# Patient Record
Sex: MALE | Race: OTHER | HISPANIC OR LATINO
[De-identification: names, ages, dates, MRNs, and addresses within clinical notes are randomized per-mention and may not be internally consistent; named-entity substitution may affect disease eponyms.]

---

## 2017-02-15 ENCOUNTER — RECORD ABSTRACTING (OUTPATIENT)
Age: 62
End: 2017-02-15

## 2017-02-15 DIAGNOSIS — F15.90 OTHER STIMULANT USE, UNSPECIFIED, UNCOMPLICATED: ICD-10-CM

## 2017-02-15 DIAGNOSIS — Z78.9 OTHER SPECIFIED HEALTH STATUS: ICD-10-CM

## 2017-02-15 DIAGNOSIS — Z83.3 FAMILY HISTORY OF DIABETES MELLITUS: ICD-10-CM

## 2017-02-15 DIAGNOSIS — K57.30 DIVERTICULOSIS OF LARGE INTESTINE W/OUT PERFORATION OR ABSCESS W/OUT BLEEDING: ICD-10-CM

## 2017-03-01 ENCOUNTER — LABORATORY RESULT (OUTPATIENT)
Age: 62
End: 2017-03-01

## 2017-03-23 ENCOUNTER — APPOINTMENT (OUTPATIENT)
Dept: GASTROENTEROLOGY | Facility: CLINIC | Age: 62
End: 2017-03-23

## 2017-03-23 VITALS
RESPIRATION RATE: 12 BRPM | WEIGHT: 228 LBS | DIASTOLIC BLOOD PRESSURE: 90 MMHG | SYSTOLIC BLOOD PRESSURE: 144 MMHG | BODY MASS INDEX: 32.64 KG/M2 | HEIGHT: 70 IN | HEART RATE: 98 BPM

## 2018-05-29 ENCOUNTER — RX RENEWAL (OUTPATIENT)
Age: 63
End: 2018-05-29

## 2018-05-29 RX ORDER — PANTOPRAZOLE 40 MG/1
40 TABLET, DELAYED RELEASE ORAL TWICE DAILY
Qty: 180 | Refills: 2 | Status: ACTIVE | COMMUNITY
Start: 2017-03-23 | End: 1900-01-01

## 2019-01-17 ENCOUNTER — EMERGENCY (EMERGENCY)
Facility: HOSPITAL | Age: 64
LOS: 1 days | Discharge: HOME | End: 2019-01-17
Attending: EMERGENCY MEDICINE
Payer: COMMERCIAL

## 2019-01-17 VITALS
DIASTOLIC BLOOD PRESSURE: 92 MMHG | SYSTOLIC BLOOD PRESSURE: 143 MMHG | OXYGEN SATURATION: 96 % | HEART RATE: 85 BPM | TEMPERATURE: 98 F | RESPIRATION RATE: 18 BRPM

## 2019-01-17 VITALS — WEIGHT: 210.1 LBS

## 2019-01-17 DIAGNOSIS — Z98.89 OTHER SPECIFIED POSTPROCEDURAL STATES: Chronic | ICD-10-CM

## 2019-01-17 LAB
ALBUMIN SERPL ELPH-MCNC: 4.3 G/DL — SIGNIFICANT CHANGE UP (ref 3.3–5.2)
ALP SERPL-CCNC: 71 U/L — SIGNIFICANT CHANGE UP (ref 40–120)
ALT FLD-CCNC: 22 U/L — SIGNIFICANT CHANGE UP
ANION GAP SERPL CALC-SCNC: 12 MMOL/L — SIGNIFICANT CHANGE UP (ref 5–17)
APTT BLD: 28.1 SEC — SIGNIFICANT CHANGE UP (ref 27.5–36.3)
AST SERPL-CCNC: 21 U/L — SIGNIFICANT CHANGE UP
BASOPHILS # BLD AUTO: 0.2 K/UL — SIGNIFICANT CHANGE UP (ref 0–0.2)
BASOPHILS NFR BLD AUTO: 2 % — SIGNIFICANT CHANGE UP (ref 0–2)
BILIRUB SERPL-MCNC: 0.4 MG/DL — SIGNIFICANT CHANGE UP (ref 0.4–2)
BLD GP AB SCN SERPL QL: SIGNIFICANT CHANGE UP
BUN SERPL-MCNC: 12 MG/DL — SIGNIFICANT CHANGE UP (ref 8–20)
CALCIUM SERPL-MCNC: 9.1 MG/DL — SIGNIFICANT CHANGE UP (ref 8.6–10.2)
CHLORIDE SERPL-SCNC: 100 MMOL/L — SIGNIFICANT CHANGE UP (ref 98–107)
CO2 SERPL-SCNC: 27 MMOL/L — SIGNIFICANT CHANGE UP (ref 22–29)
CREAT SERPL-MCNC: 0.83 MG/DL — SIGNIFICANT CHANGE UP (ref 0.5–1.3)
EOSINOPHIL # BLD AUTO: 0.2 K/UL — SIGNIFICANT CHANGE UP (ref 0–0.5)
EOSINOPHIL NFR BLD AUTO: 2.8 % — SIGNIFICANT CHANGE UP (ref 0–6)
GLUCOSE SERPL-MCNC: 103 MG/DL — SIGNIFICANT CHANGE UP (ref 70–115)
HCT VFR BLD CALC: 45 % — SIGNIFICANT CHANGE UP (ref 42–52)
HGB BLD-MCNC: 14.5 G/DL — SIGNIFICANT CHANGE UP (ref 14–18)
INR BLD: 1.08 RATIO — SIGNIFICANT CHANGE UP (ref 0.88–1.16)
LIDOCAIN IGE QN: 23 U/L — SIGNIFICANT CHANGE UP (ref 22–51)
LYMPHOCYTES # BLD AUTO: 2.3 K/UL — SIGNIFICANT CHANGE UP (ref 1–4.8)
LYMPHOCYTES # BLD AUTO: 31 % — SIGNIFICANT CHANGE UP (ref 20–55)
MCHC RBC-ENTMCNC: 26.2 PG — LOW (ref 27–31)
MCHC RBC-ENTMCNC: 32.2 G/DL — SIGNIFICANT CHANGE UP (ref 32–36)
MCV RBC AUTO: 81.2 FL — SIGNIFICANT CHANGE UP (ref 80–94)
MONOCYTES # BLD AUTO: 0.7 K/UL — SIGNIFICANT CHANGE UP (ref 0–0.8)
MONOCYTES NFR BLD AUTO: 9.5 % — SIGNIFICANT CHANGE UP (ref 3–10)
NEUTROPHILS # BLD AUTO: 4.1 K/UL — SIGNIFICANT CHANGE UP (ref 1.8–8)
NEUTROPHILS NFR BLD AUTO: 54.7 % — SIGNIFICANT CHANGE UP (ref 37–73)
PLATELET # BLD AUTO: 346 K/UL — SIGNIFICANT CHANGE UP (ref 150–400)
POTASSIUM SERPL-MCNC: 4.6 MMOL/L — SIGNIFICANT CHANGE UP (ref 3.5–5.3)
POTASSIUM SERPL-SCNC: 4.6 MMOL/L — SIGNIFICANT CHANGE UP (ref 3.5–5.3)
PROT SERPL-MCNC: 7.9 G/DL — SIGNIFICANT CHANGE UP (ref 6.6–8.7)
PROTHROM AB SERPL-ACNC: 12.4 SEC — SIGNIFICANT CHANGE UP (ref 10–12.9)
RBC # BLD: 5.54 M/UL — SIGNIFICANT CHANGE UP (ref 4.6–6.2)
RBC # FLD: 15.2 % — SIGNIFICANT CHANGE UP (ref 11–15.6)
SODIUM SERPL-SCNC: 139 MMOL/L — SIGNIFICANT CHANGE UP (ref 135–145)
TROPONIN T SERPL-MCNC: <0.01 NG/ML — SIGNIFICANT CHANGE UP (ref 0–0.06)
TYPE + AB SCN PNL BLD: SIGNIFICANT CHANGE UP
WBC # BLD: 7.5 K/UL — SIGNIFICANT CHANGE UP (ref 4.8–10.8)
WBC # FLD AUTO: 7.5 K/UL — SIGNIFICANT CHANGE UP (ref 4.8–10.8)

## 2019-01-17 PROCEDURE — 85610 PROTHROMBIN TIME: CPT

## 2019-01-17 PROCEDURE — 86900 BLOOD TYPING SEROLOGIC ABO: CPT

## 2019-01-17 PROCEDURE — 99284 EMERGENCY DEPT VISIT MOD MDM: CPT

## 2019-01-17 PROCEDURE — 84484 ASSAY OF TROPONIN QUANT: CPT

## 2019-01-17 PROCEDURE — 85730 THROMBOPLASTIN TIME PARTIAL: CPT

## 2019-01-17 PROCEDURE — 96375 TX/PRO/DX INJ NEW DRUG ADDON: CPT

## 2019-01-17 PROCEDURE — T1013: CPT

## 2019-01-17 PROCEDURE — 86850 RBC ANTIBODY SCREEN: CPT

## 2019-01-17 PROCEDURE — 86901 BLOOD TYPING SEROLOGIC RH(D): CPT

## 2019-01-17 PROCEDURE — 71250 CT THORAX DX C-: CPT

## 2019-01-17 PROCEDURE — 99284 EMERGENCY DEPT VISIT MOD MDM: CPT | Mod: 25

## 2019-01-17 PROCEDURE — 96374 THER/PROPH/DIAG INJ IV PUSH: CPT

## 2019-01-17 PROCEDURE — 80053 COMPREHEN METABOLIC PANEL: CPT

## 2019-01-17 PROCEDURE — 85027 COMPLETE CBC AUTOMATED: CPT

## 2019-01-17 PROCEDURE — 71250 CT THORAX DX C-: CPT | Mod: 26

## 2019-01-17 PROCEDURE — 83690 ASSAY OF LIPASE: CPT

## 2019-01-17 PROCEDURE — 36415 COLL VENOUS BLD VENIPUNCTURE: CPT

## 2019-01-17 RX ORDER — FAMOTIDINE 10 MG/ML
20 INJECTION INTRAVENOUS ONCE
Qty: 0 | Refills: 0 | Status: COMPLETED | OUTPATIENT
Start: 2019-01-17 | End: 2019-01-17

## 2019-01-17 RX ORDER — CHLORPROMAZINE HCL 10 MG
1 TABLET ORAL
Qty: 15 | Refills: 0 | OUTPATIENT
Start: 2019-01-17 | End: 2019-01-21

## 2019-01-17 RX ORDER — ONDANSETRON 8 MG/1
8 TABLET, FILM COATED ORAL ONCE
Qty: 0 | Refills: 0 | Status: COMPLETED | OUTPATIENT
Start: 2019-01-17 | End: 2019-01-17

## 2019-01-17 RX ORDER — BACLOFEN 100 %
20 POWDER (GRAM) MISCELLANEOUS ONCE
Qty: 0 | Refills: 0 | Status: COMPLETED | OUTPATIENT
Start: 2019-01-17 | End: 2019-01-17

## 2019-01-17 RX ORDER — CHLORPROMAZINE HCL 10 MG
50 TABLET ORAL ONCE
Qty: 0 | Refills: 0 | Status: COMPLETED | OUTPATIENT
Start: 2019-01-17 | End: 2019-01-17

## 2019-01-17 RX ORDER — PANTOPRAZOLE SODIUM 20 MG/1
1 TABLET, DELAYED RELEASE ORAL
Qty: 60 | Refills: 0 | OUTPATIENT
Start: 2019-01-17 | End: 2019-02-15

## 2019-01-17 RX ORDER — FAMOTIDINE 10 MG/ML
1 INJECTION INTRAVENOUS
Qty: 5 | Refills: 0 | OUTPATIENT
Start: 2019-01-17 | End: 2019-01-21

## 2019-01-17 RX ADMIN — FAMOTIDINE 20 MILLIGRAM(S): 10 INJECTION INTRAVENOUS at 14:02

## 2019-01-17 RX ADMIN — ONDANSETRON 8 MILLIGRAM(S): 8 TABLET, FILM COATED ORAL at 14:02

## 2019-01-17 RX ADMIN — Medication 20 MILLIGRAM(S): at 14:01

## 2019-01-17 RX ADMIN — Medication 50 MILLIGRAM(S): at 14:01

## 2019-01-17 NOTE — ED STATDOCS - ATTENDING CONTRIBUTION TO CARE
I, Eugene Ladd, performed the initial face to face bedside interview with this patient regarding history of present illness, review of symptoms and relevant past medical, social and family history.  I completed an independent physical examination.  I was the initial provider who evaluated this patient. I have signed out the follow up of any pending tests (i.e. labs, radiological studies) to the ACP.  I have communicated the patient’s plan of care and disposition with the ACP.

## 2019-01-17 NOTE — ED ADULT TRIAGE NOTE - CHIEF COMPLAINT QUOTE
has had hiccups for 3 days, went to see by dr carlton in office and was directed to [facility] ed because he is in hospital toda;y

## 2019-01-17 NOTE — ED STATDOCS - MEDICAL DECISION MAKING DETAILS
Will medically tx for hiccups with baclofen, Thorazine, and antiemetics, then order labs, EKG and CT chest.

## 2019-01-17 NOTE — ED STATDOCS - OBJECTIVE STATEMENT
62 y/o M pt with hx of knee surgery c/o cough, with associated hiccups for 5 days. Pt states he is also c/o abdominal pain, and nausea due to hiccups. Pt states it feels as if "things are coming up from his stomach to his chest". Pt's abdominal pain and nausea worsen when he hiccups. Pt reports he called his GI doctor, and saw him then was advised to come into ED for further evaluation. Denies fever. No further complaints at this time.

## 2019-01-17 NOTE — ED ADULT NURSE NOTE - OBJECTIVE STATEMENT
assumed pt care at 1350.  Per pt has had hiccups X 5 days.  Also c/o epigastric pain. Hx of gastritis

## 2019-01-17 NOTE — ED STATDOCS - PROGRESS NOTE DETAILS
CT results reviewed, attempted multiple attempts to contact  [phone] [place] for consult, pending labs and medication.  As per patient has been coughing and having episodes of hiccuping since saturday, called GI and was referred to ED.  Chest CTAB, heart s1s2, abd soft NT ND, -CVAT.  Will re-assess. spoke to MD carlton GI reccomends 25mg of thorazine TID, c/w Pantoprazole 40mg BID before breakfast and dinner, add on 40mg of famotidine, as per GI patient had EGD x 1 year ago, had large hernia and esophagitis, reccomends patient to call office to be squeezed in this week.  Patient informed of plan and agrees.  Will re-assess after medication. patient feeling better after medication, RX medications as discussed, to f/u with GI in AM

## 2019-01-17 NOTE — ED ADULT NURSE NOTE - CHIEF COMPLAINT QUOTE
has had hiccups for 3 days, went to see by dr carlton in office and was directed to [facility] ed because he is in hospital toda;y

## 2019-01-24 ENCOUNTER — APPOINTMENT (OUTPATIENT)
Dept: GASTROENTEROLOGY | Facility: CLINIC | Age: 64
End: 2019-01-24
Payer: COMMERCIAL

## 2019-01-24 VITALS
HEART RATE: 85 BPM | BODY MASS INDEX: 30.35 KG/M2 | DIASTOLIC BLOOD PRESSURE: 86 MMHG | HEIGHT: 70 IN | OXYGEN SATURATION: 98 % | SYSTOLIC BLOOD PRESSURE: 140 MMHG | WEIGHT: 212 LBS | RESPIRATION RATE: 12 BRPM

## 2019-01-24 DIAGNOSIS — D50.9 IRON DEFICIENCY ANEMIA, UNSPECIFIED: ICD-10-CM

## 2019-01-24 DIAGNOSIS — K59.09 OTHER CONSTIPATION: ICD-10-CM

## 2019-01-24 PROCEDURE — 99214 OFFICE O/P EST MOD 30 MIN: CPT

## 2019-02-06 ENCOUNTER — OUTPATIENT (OUTPATIENT)
Dept: OUTPATIENT SERVICES | Facility: HOSPITAL | Age: 64
LOS: 1 days | End: 2019-02-06
Payer: COMMERCIAL

## 2019-02-06 ENCOUNTER — APPOINTMENT (OUTPATIENT)
Dept: GASTROENTEROLOGY | Facility: GI CENTER | Age: 64
End: 2019-02-06
Payer: COMMERCIAL

## 2019-02-06 ENCOUNTER — RESULT REVIEW (OUTPATIENT)
Age: 64
End: 2019-02-06

## 2019-02-06 DIAGNOSIS — K21.0 GASTRO-ESOPHAGEAL REFLUX DISEASE WITH ESOPHAGITIS: ICD-10-CM

## 2019-02-06 DIAGNOSIS — R06.6 HICCOUGH: ICD-10-CM

## 2019-02-06 DIAGNOSIS — R93.89 ABNORMAL FINDINGS ON DIAGNOSTIC IMAGING OF OTHER SPECIFIED BODY STRUCTURES: ICD-10-CM

## 2019-02-06 DIAGNOSIS — Z98.89 OTHER SPECIFIED POSTPROCEDURAL STATES: Chronic | ICD-10-CM

## 2019-02-06 PROCEDURE — 88305 TISSUE EXAM BY PATHOLOGIST: CPT | Mod: 26

## 2019-02-06 PROCEDURE — 88342 IMHCHEM/IMCYTCHM 1ST ANTB: CPT | Mod: 26

## 2019-02-06 PROCEDURE — 88342 IMHCHEM/IMCYTCHM 1ST ANTB: CPT

## 2019-02-06 PROCEDURE — 43239 EGD BIOPSY SINGLE/MULTIPLE: CPT

## 2019-02-06 PROCEDURE — 88305 TISSUE EXAM BY PATHOLOGIST: CPT

## 2019-02-06 NOTE — PHYSICAL EXAM
[General Appearance - Alert] : alert [General Appearance - In No Acute Distress] : in no acute distress [Sclera] : the sclera and conjunctiva were normal [PERRL With Normal Accommodation] : pupils were equal in size, round, and reactive to light [Extraocular Movements] : extraocular movements were intact [Outer Ear] : the ears and nose were normal in appearance [Examination Of The Oral Cavity] : the lips and gums were normal [Oropharynx] : the oropharynx was normal [Neck Appearance] : the appearance of the neck was normal [Bowel Sounds] : normal bowel sounds [Abdomen Soft] : soft [Abdomen Tenderness] : non-tender [] : no hepato-splenomegaly [Abdomen Mass (___ Cm)] : no abdominal mass palpated [No CVA Tenderness] : no ~M costovertebral angle tenderness [No Spinal Tenderness] : no spinal tenderness [Motor Exam] : the motor exam was normal [Oriented To Time, Place, And Person] : oriented to person, place, and time [Impaired Insight] : insight and judgment were intact [Affect] : the affect was normal [Auscultation Breath Sounds / Voice Sounds] : lungs were clear to auscultation bilaterally [Heart Rate And Rhythm] : heart rate was normal and rhythm regular [Heart Sounds] : normal S1 and S2 [Heart Sounds Gallop] : no gallops [Murmurs] : no murmurs [Heart Sounds Pericardial Friction Rub] : no pericardial rub [FreeTextEntry1] : overweight

## 2019-02-06 NOTE — ASSESSMENT
[FreeTextEntry1] : Not certain as to the etiology of the patient's jiccups, which have since resolved,  but in view of the abnormal chest CAT scan our procedure with followup upper endoscopy. In the meantime he'll continue to take pantoprazole but I recommended that he take the second dose just prior to dinner. The risks, benefits, complications and possible adverse consequences associated with upper endoscopy were discussed with the patient.\par

## 2019-02-06 NOTE — HISTORY OF PRESENT ILLNESS
[de-identified] : Since last being seen almost 2 years ago he has continued to take pantoprazole 40 mg twice daily with first dose before breakfast and the second os in the evening. The heartburn completely resolved has never recur. However he was seen in the [facility] EMERGENCY room on January 17 due to the sudden onset of intractable hiccups. He underwent a chest CAT scan that showed a retrocardiac moderate size gastroesophageal hernia with thick folds at the gastroesophageal junction resulting in some dilation of the proximal esophagus. It was mentioned that a malignancy of the gastroesophageal junction are benign fibrotic stricture should be considered. He had undergone upper endoscopy in July of 2016 that revealed  a very large hiatal hernia and LA class C esophagitis she has been on pantoprazole twice daily ever since. He also underwent a colonoscopy that revealed some left-sided diverticulosis and small internal hemorrhoids. There was a 2 mm nodule in the cecum that was removed and was simply a lymphoid follicle. At that time the procedures were performed due to the presence of an iron deficiency anemia which was ultimately felt to be due to severe esophagitis he was treated with iron supplements and the anemia resolved and the iron supplements have been discontinued. In the emergency room his prothrombin time was normal as was a CBC. Electrolytes, BUN, creatinine, calcium and liver function tests are also normal. A lipase was normal. Was discharged on Thorazine 25 mg p.o. t.i.d. and within one to 2 days the hiccups resolved and have not recurred. There is a prior history of constipation but he is taking Benefiber 1 tablespoon daily and denies any further bouts of constipation. There is no diarrhea, rectal bleeding or melena. His appetite and weight are stable. There is no dysphagia.

## 2019-02-06 NOTE — PROCEDURE
[With Biopsy] : with biopsy [GERD] : GERD [X-ray Abnormal] : x-ray abnormal [Procedure Explained] : The procedure was explained [Allergies Reviewed] : allergies reviewed. [Patient] : patient [Risks] : Risks [Benefits] : benefits [Alternatives] : alternatives [Consent Obtained] : written consent was obtained prior to the procedure and is detailed in the patient's record [Versed ___ mg IV] : Versed [unfilled] ~Umg intravenously [Propofol ___ mg IV] : Propofol [unfilled] ~Umg intravenously [Hiatal Hernia] : hiatal hernia [Normal] : Normal [Sent to Pathology] : was sent to pathology for analysis [Tolerated Well] : the patient tolerated the procedure well [Vital Signs Stable] : the vital signs were stable [de-identified] : There is a large hiatal hernia from 37-45cm from incisors with some mild friability at the GE junction at 37cm which was very patulous and an irregular Z line which was biopsied at 36 and 37cm. The GE junction was barely identifiable with almost no narrowing at that level. [de-identified] : biopsy taken for H Pylori [de-identified] : biopsy taken for H Pylori [de-identified] : he will continue to take pantoprazole 40mg PO BID

## 2019-02-06 NOTE — PHYSICAL EXAM
[General Appearance - Alert] : alert [General Appearance - In No Acute Distress] : in no acute distress [FreeTextEntry1] : overweight [Sclera] : the sclera and conjunctiva were normal [PERRL With Normal Accommodation] : pupils were equal in size, round, and reactive to light [Extraocular Movements] : extraocular movements were intact [Outer Ear] : the ears and nose were normal in appearance [Examination Of The Oral Cavity] : the lips and gums were normal [Oropharynx] : the oropharynx was normal [Neck Appearance] : the appearance of the neck was normal [Auscultation Breath Sounds / Voice Sounds] : lungs were clear to auscultation bilaterally [Heart Rate And Rhythm] : heart rate was normal and rhythm regular [Heart Sounds] : normal S1 and S2 [Heart Sounds Gallop] : no gallops [Murmurs] : no murmurs [Heart Sounds Pericardial Friction Rub] : no pericardial rub [Bowel Sounds] : normal bowel sounds [Abdomen Soft] : soft [Abdomen Tenderness] : non-tender [] : no hepato-splenomegaly [Abdomen Mass (___ Cm)] : no abdominal mass palpated [No CVA Tenderness] : no ~M costovertebral angle tenderness [No Spinal Tenderness] : no spinal tenderness [Motor Exam] : the motor exam was normal [Oriented To Time, Place, And Person] : oriented to person, place, and time [Impaired Insight] : insight and judgment were intact [Affect] : the affect was normal

## 2019-02-06 NOTE — PROCEDURE
[With Biopsy] : with biopsy [GERD] : GERD [X-ray Abnormal] : x-ray abnormal [Procedure Explained] : The procedure was explained [Allergies Reviewed] : allergies reviewed. [Patient] : patient [Risks] : Risks [Benefits] : benefits [Alternatives] : alternatives [Consent Obtained] : written consent was obtained prior to the procedure and is detailed in the patient's record [Versed ___ mg IV] : Versed [unfilled] ~Umg intravenously [Propofol ___ mg IV] : Propofol [unfilled] ~Umg intravenously [Hiatal Hernia] : hiatal hernia [Normal] : Normal [Sent to Pathology] : was sent to pathology for analysis [Tolerated Well] : the patient tolerated the procedure well [Vital Signs Stable] : the vital signs were stable [de-identified] : There is a large hiatal hernia from 37-45cm from incisors with some mild friability at the GE junction at 37cm which was very patulous and an irregular Z line which was biopsied at 36 and 37cm. The GE junction was barely identifiable with almost no narrowing at that level. [de-identified] : biopsy taken for H Pylori [de-identified] : biopsy taken for H Pylori [de-identified] : he will continue to take pantoprazole 40mg PO BID

## 2019-02-10 LAB — SURGICAL PATHOLOGY STUDY: SIGNIFICANT CHANGE UP

## 2020-03-26 ENCOUNTER — APPOINTMENT (OUTPATIENT)
Dept: GASTROENTEROLOGY | Facility: CLINIC | Age: 65
End: 2020-03-26

## 2020-04-16 RX ORDER — PANTOPRAZOLE 40 MG/1
40 TABLET, DELAYED RELEASE ORAL
Qty: 60 | Refills: 5 | Status: ACTIVE | COMMUNITY
Start: 2020-04-16 | End: 1900-01-01

## 2020-04-16 RX ORDER — PANTOPRAZOLE 40 MG/1
40 TABLET, DELAYED RELEASE ORAL
Qty: 18 | Refills: 3 | Status: ACTIVE | COMMUNITY
Start: 2020-04-16 | End: 1900-01-01

## 2021-04-20 ENCOUNTER — EMERGENCY (EMERGENCY)
Facility: HOSPITAL | Age: 66
LOS: 1 days | Discharge: HOME | End: 2021-04-20
Attending: EMERGENCY MEDICINE
Payer: COMMERCIAL

## 2021-04-20 VITALS
HEART RATE: 98 BPM | TEMPERATURE: 98 F | SYSTOLIC BLOOD PRESSURE: 146 MMHG | OXYGEN SATURATION: 95 % | RESPIRATION RATE: 19 BRPM | DIASTOLIC BLOOD PRESSURE: 98 MMHG | HEIGHT: 78 IN

## 2021-04-20 DIAGNOSIS — Z98.89 OTHER SPECIFIED POSTPROCEDURAL STATES: Chronic | ICD-10-CM

## 2021-04-20 LAB
ALBUMIN SERPL ELPH-MCNC: 4.4 G/DL — SIGNIFICANT CHANGE UP (ref 3.3–5.2)
ALP SERPL-CCNC: 62 U/L — SIGNIFICANT CHANGE UP (ref 40–120)
ALT FLD-CCNC: 23 U/L — SIGNIFICANT CHANGE UP
ANION GAP SERPL CALC-SCNC: 14 MMOL/L — SIGNIFICANT CHANGE UP (ref 5–17)
APTT BLD: 29.2 SEC — SIGNIFICANT CHANGE UP (ref 27.5–35.5)
AST SERPL-CCNC: 21 U/L — SIGNIFICANT CHANGE UP
BASOPHILS # BLD AUTO: 0.21 K/UL — HIGH (ref 0–0.2)
BASOPHILS NFR BLD AUTO: 2.6 % — HIGH (ref 0–2)
BILIRUB SERPL-MCNC: 0.4 MG/DL — SIGNIFICANT CHANGE UP (ref 0.4–2)
BUN SERPL-MCNC: 12 MG/DL — SIGNIFICANT CHANGE UP (ref 8–20)
CALCIUM SERPL-MCNC: 9.4 MG/DL — SIGNIFICANT CHANGE UP (ref 8.6–10.2)
CHLORIDE SERPL-SCNC: 95 MMOL/L — LOW (ref 98–107)
CO2 SERPL-SCNC: 24 MMOL/L — SIGNIFICANT CHANGE UP (ref 22–29)
CREAT SERPL-MCNC: 0.84 MG/DL — SIGNIFICANT CHANGE UP (ref 0.5–1.3)
EOSINOPHIL # BLD AUTO: 0.21 K/UL — SIGNIFICANT CHANGE UP (ref 0–0.5)
EOSINOPHIL NFR BLD AUTO: 2.6 % — SIGNIFICANT CHANGE UP (ref 0–6)
GLUCOSE SERPL-MCNC: 132 MG/DL — HIGH (ref 70–99)
HCT VFR BLD CALC: 48.6 % — SIGNIFICANT CHANGE UP (ref 39–50)
HGB BLD-MCNC: 15.8 G/DL — SIGNIFICANT CHANGE UP (ref 13–17)
INR BLD: 1.12 RATIO — SIGNIFICANT CHANGE UP (ref 0.88–1.16)
LYMPHOCYTES # BLD AUTO: 1.74 K/UL — SIGNIFICANT CHANGE UP (ref 1–3.3)
LYMPHOCYTES # BLD AUTO: 21.7 % — SIGNIFICANT CHANGE UP (ref 13–44)
MANUAL SMEAR VERIFICATION: SIGNIFICANT CHANGE UP
MCHC RBC-ENTMCNC: 25.1 PG — LOW (ref 27–34)
MCHC RBC-ENTMCNC: 32.5 GM/DL — SIGNIFICANT CHANGE UP (ref 32–36)
MCV RBC AUTO: 77.1 FL — LOW (ref 80–100)
METAMYELOCYTES # FLD: 0.9 % — HIGH (ref 0–0)
MONOCYTES # BLD AUTO: 0.77 K/UL — SIGNIFICANT CHANGE UP (ref 0–0.9)
MONOCYTES NFR BLD AUTO: 9.6 % — SIGNIFICANT CHANGE UP (ref 2–14)
NEUTROPHILS # BLD AUTO: 4.75 K/UL — SIGNIFICANT CHANGE UP (ref 1.8–7.4)
NEUTROPHILS NFR BLD AUTO: 59.1 % — SIGNIFICANT CHANGE UP (ref 43–77)
PLAT MORPH BLD: NORMAL — SIGNIFICANT CHANGE UP
PLATELET # BLD AUTO: 305 K/UL — SIGNIFICANT CHANGE UP (ref 150–400)
POTASSIUM SERPL-MCNC: 3.9 MMOL/L — SIGNIFICANT CHANGE UP (ref 3.5–5.3)
POTASSIUM SERPL-SCNC: 3.9 MMOL/L — SIGNIFICANT CHANGE UP (ref 3.5–5.3)
PROT SERPL-MCNC: 7.8 G/DL — SIGNIFICANT CHANGE UP (ref 6.6–8.7)
PROTHROM AB SERPL-ACNC: 12.9 SEC — SIGNIFICANT CHANGE UP (ref 10.6–13.6)
RBC # BLD: 6.3 M/UL — HIGH (ref 4.2–5.8)
RBC # FLD: 21.2 % — HIGH (ref 10.3–14.5)
RBC BLD AUTO: NORMAL — SIGNIFICANT CHANGE UP
SMUDGE CELLS # BLD: PRESENT — SIGNIFICANT CHANGE UP
SODIUM SERPL-SCNC: 133 MMOL/L — LOW (ref 135–145)
TROPONIN T SERPL-MCNC: <0.01 NG/ML — SIGNIFICANT CHANGE UP (ref 0–0.06)
VARIANT LYMPHS # BLD: 3.5 % — SIGNIFICANT CHANGE UP (ref 0–6)
WBC # BLD: 8.04 K/UL — SIGNIFICANT CHANGE UP (ref 3.8–10.5)
WBC # FLD AUTO: 8.04 K/UL — SIGNIFICANT CHANGE UP (ref 3.8–10.5)

## 2021-04-20 PROCEDURE — 82962 GLUCOSE BLOOD TEST: CPT

## 2021-04-20 PROCEDURE — 0042T: CPT

## 2021-04-20 PROCEDURE — 84484 ASSAY OF TROPONIN QUANT: CPT

## 2021-04-20 PROCEDURE — 71045 X-RAY EXAM CHEST 1 VIEW: CPT

## 2021-04-20 PROCEDURE — 99291 CRITICAL CARE FIRST HOUR: CPT | Mod: 25

## 2021-04-20 PROCEDURE — 93005 ELECTROCARDIOGRAM TRACING: CPT

## 2021-04-20 PROCEDURE — 36415 COLL VENOUS BLD VENIPUNCTURE: CPT

## 2021-04-20 PROCEDURE — 71045 X-RAY EXAM CHEST 1 VIEW: CPT | Mod: 26

## 2021-04-20 PROCEDURE — 96374 THER/PROPH/DIAG INJ IV PUSH: CPT | Mod: XU

## 2021-04-20 PROCEDURE — 70498 CT ANGIOGRAPHY NECK: CPT | Mod: 26,MA

## 2021-04-20 PROCEDURE — 70450 CT HEAD/BRAIN W/O DYE: CPT | Mod: 26,59,MA

## 2021-04-20 PROCEDURE — 85025 COMPLETE CBC W/AUTO DIFF WBC: CPT

## 2021-04-20 PROCEDURE — 93010 ELECTROCARDIOGRAM REPORT: CPT

## 2021-04-20 PROCEDURE — 70450 CT HEAD/BRAIN W/O DYE: CPT

## 2021-04-20 PROCEDURE — 80053 COMPREHEN METABOLIC PANEL: CPT

## 2021-04-20 PROCEDURE — 70496 CT ANGIOGRAPHY HEAD: CPT | Mod: 26,MA

## 2021-04-20 PROCEDURE — 85730 THROMBOPLASTIN TIME PARTIAL: CPT

## 2021-04-20 PROCEDURE — 99291 CRITICAL CARE FIRST HOUR: CPT

## 2021-04-20 PROCEDURE — 70498 CT ANGIOGRAPHY NECK: CPT

## 2021-04-20 PROCEDURE — 70496 CT ANGIOGRAPHY HEAD: CPT

## 2021-04-20 PROCEDURE — 85610 PROTHROMBIN TIME: CPT

## 2021-04-20 RX ORDER — ACETAMINOPHEN 500 MG
975 TABLET ORAL ONCE
Refills: 0 | Status: COMPLETED | OUTPATIENT
Start: 2021-04-20 | End: 2021-04-20

## 2021-04-20 RX ORDER — METHOCARBAMOL 500 MG/1
1500 TABLET, FILM COATED ORAL ONCE
Refills: 0 | Status: COMPLETED | OUTPATIENT
Start: 2021-04-20 | End: 2021-04-20

## 2021-04-20 RX ORDER — KETOROLAC TROMETHAMINE 30 MG/ML
15 SYRINGE (ML) INJECTION ONCE
Refills: 0 | Status: DISCONTINUED | OUTPATIENT
Start: 2021-04-20 | End: 2021-04-20

## 2021-04-20 RX ADMIN — METHOCARBAMOL 1500 MILLIGRAM(S): 500 TABLET, FILM COATED ORAL at 22:29

## 2021-04-20 RX ADMIN — Medication 975 MILLIGRAM(S): at 20:46

## 2021-04-20 RX ADMIN — Medication 15 MILLIGRAM(S): at 22:29

## 2021-04-20 NOTE — ED ADULT TRIAGE NOTE - CHIEF COMPLAINT QUOTE
Pt. 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  Pt. with no significant PMH.  Code stroke called on pt.

## 2021-04-20 NOTE — ED PROVIDER NOTE - CARE PROVIDER_API CALL
Joshua Pate; PhD)  Neurology; Vascular Neurology  [address], [address]  [place] [address]  Phone: [phone]  Fax: [phone]  Follow Up Time:     Shirin Ledesma)  Orthopaedic Surgery; Surgery of the Hand  [address] [place] [address]  Phone: [phone]  Fax: [phone]  Follow Up Time:

## 2021-04-20 NOTE — ED PROVIDER NOTE - OBJECTIVE STATEMENT
65yom w/ BPH was driving home from work and at 4pm had a sudden onset of numbness in the middle 3 fingers in the right hand, paresthesia in the right arm, and transient blurring of his vision. Vision is better, denies any focal weakness, but still endorses tingling in his fingers and a feeling of being generally weak. 65yom w/ BPH was driving home from work and at 4pm had a sudden onset of numbness in the middle 3 fingers in the right hand, paresthesia in the right arm, and transient blurring of his vision. Vision is better, denies any focal weakness, but still endorses tingling in his fingers and a feeling of being generally weak. He reports he has been getting some intermittent left sided headache, sometimes some right sided neck pains as well.

## 2021-04-20 NOTE — ED PROVIDER NOTE - CLINICAL SUMMARY MEDICAL DECISION MAKING FREE TEXT BOX
isolated paresthesia in the median nerve distribution of the right hand, some associated neck discomfort, NIH 1 for sensory change but no focal motor deficits or cranial nerve symptoms. CT/CTA/CTP all without any acute pathology. Given pt's occupation as a  suspect that his symptoms are more likely of MSK origin rather than acute stroke, fully resolved with NSAID and robaxin so at this time no indication for MR. Monitored in ED without recurrence. Will give neurology follow up, ortho follow up, strict return precautions.

## 2021-04-20 NOTE — ED ADULT NURSE NOTE - NSIMPLEMENTINTERV_GEN_ALL_ED
Implemented All Universal Safety Interventions:  [place] to call system. Call bell, personal items and telephone within reach. Instruct patient to call for assistance. Room bathroom lighting operational. Non-slip footwear when patient is off stretcher. Physically safe environment: no spills, clutter or unnecessary equipment. Stretcher in lowest position, wheels locked, appropriate side rails in place.

## 2021-04-20 NOTE — ED PROVIDER NOTE - PATIENT PORTAL LINK FT
You can access the FollowMyHealth Patient Portal offered by [facility] by registering at the following website: [email or web address]. By joining [facility]’s FollowMyHealth portal, you will also be able to view your health information using other applications (apps) compatible with our system.

## 2021-04-20 NOTE — ED ADULT NURSE NOTE - OBJECTIVE STATEMENT
Normally ambulatory  reporting sudden onset of tingling/numbness to R middle three fingers and generalized weakness. Patient reported vision changes as well which have since subsided. Neurologically in tact, all extremities strong and equal, able to make his needs known. CODE STROKE called in triage, labs drawn and sent, CT completed. Pending reevaluation. Calm and cooperative, offers no complaints of CP/SOB, lethargy, HA at this time. Continuous cardiac monitoring in place. Safety maintained, needs attended, will continue to monitor.

## 2021-04-21 VITALS
HEART RATE: 74 BPM | OXYGEN SATURATION: 98 % | TEMPERATURE: 98 F | SYSTOLIC BLOOD PRESSURE: 118 MMHG | DIASTOLIC BLOOD PRESSURE: 78 MMHG | RESPIRATION RATE: 17 BRPM

## 2021-04-21 RX ORDER — METHOCARBAMOL 500 MG/1
2 TABLET, FILM COATED ORAL
Qty: 18 | Refills: 0
Start: 2021-04-21 | End: 2021-04-23

## 2021-04-21 RX ORDER — IBUPROFEN 200 MG
1 TABLET ORAL
Qty: 30 | Refills: 0
Start: 2021-04-21

## 2021-06-22 ENCOUNTER — APPOINTMENT (OUTPATIENT)
Dept: NEUROLOGY | Facility: CLINIC | Age: 66
End: 2021-06-22

## 2021-07-15 NOTE — ED STATDOCS - CPE ED RESP NORM
normal... Benzoyl Peroxide Counseling: Patient counseled that medicine may cause skin irritation and bleach clothing.  In the event of skin irritation, the patient was advised to reduce the amount of the drug applied or use it less frequently.   The patient verbalized understanding of the proper use and possible adverse effects of benzoyl peroxide.  All of the patient's questions and concerns were addressed.

## 2022-01-14 NOTE — ED PROVIDER NOTE - NIH STROKE SCALE: 1A. LEVEL OF CONSCIOUSNESS, QM
Discharge Summary/Instructions after an Endoscopic Procedure  Patient Name: Froilan Ray  Patient MRN: [id number]  Patient YOB: 1946  Friday, January 14, 2022  Abby Landers MD  Dear patient,  As a result of recent federal legislation (The Federal Cures Act), you may   receive lab or pathology results from your procedure in your [facility]   account before your physician is able to contact you. Your physician or   their representative will relay the results to you with their   recommendations at their soonest availability.  Thank you,  RESTRICTIONS:  During your procedure today, you received medications for sedation.  These   medications may affect your judgment, balance and coordination.  Therefore,   for 24 hours, you have the following restrictions:   - DO NOT drive a car, operate machinery, make legal/financial decisions,   sign important papers or drink alcohol.    ACTIVITY:  Today: no heavy lifting, straining or running due to procedural   sedation/anesthesia.  The following day: return to full activity including work.  DIET:  Eat and drink normally unless instructed otherwise.     TREATMENT FOR COMMON SIDE EFFECTS:  - Mild abdominal pain, nausea, belching, bloating or excessive gas:  rest,   eat lightly and use a heating pad.  - Sore Throat: treat with throat lozenges and/or gargle with warm salt   water.  - Because air was used during the procedure, expelling large amounts of air   from your rectum or belching is normal.  - If a bowel prep was taken, you may not have a bowel movement for 1-3 days.    This is normal.  SYMPTOMS TO WATCH FOR AND REPORT TO YOUR PHYSICIAN:  1. Abdominal pain or bloating, other than gas cramps.  2. Chest pain.  3. Back pain.  4. Signs of infection such as: chills or fever occurring within 24 hours   after the procedure.  5. Rectal bleeding, which would show as bright red, maroon, or black stools.   (A tablespoon of blood from the rectum is not  serious, especially if   hemorrhoids are present.)  6. Vomiting.  7. Weakness or dizziness.  GO DIRECTLY TO THE NEAREST EMERGENCY ROOM IF YOU HAVE ANY OF THE FOLLOWING:      Difficulty breathing              Chills and/or fever over 101 F   Persistent vomiting and/or vomiting blood   Severe abdominal pain   Severe chest pain   Black, tarry stools   Bleeding- more than one tablespoon   Any other symptom or condition that you feel may need urgent attention  Your doctor recommends these additional instructions:  If any biopsies were taken, your doctors clinic will contact you in 1 to 2   weeks with any results.  - Return patient to hospital [facility] for ongoing care.   - Advance diet as tolerated.   - Continue present medications.   - No repeat colonoscopy due to current age (66 years or older) and the   absence of colonic polyps.   - Return to my office PRN.  For questions, problems or results please call your physician - Abby Landers MD at Work:  [phone].  [facility], EMERGENCY ROOM PHONE NUMBER: [phone]  IF A COMPLICATION OR EMERGENCY SITUATION ARISES AND YOU ARE UNABLE TO REACH   YOUR PHYSICIAN - GO DIRECTLY TO THE EMERGENCY ROOM.  Abby Landers MD  1/14/2022 10:53:37 AM  This report has been verified and signed electronically.  Dear patient,  As a result of recent federal legislation (The Federal Cures Act), you may   receive lab or pathology results from your procedure in your [facility]   account before your physician is able to contact you. Your physician or   their representative will relay the results to you with their   recommendations at their soonest availability.  Thank you,  [employer]   (0) Alert; keenly responsive

## 2022-08-18 ENCOUNTER — OFFICE (OUTPATIENT)
Dept: URBAN - METROPOLITAN AREA CLINIC 113 | Facility: CLINIC | Age: 67
Setting detail: OPHTHALMOLOGY
End: 2022-08-18
Payer: COMMERCIAL

## 2022-08-18 DIAGNOSIS — H52.4: ICD-10-CM

## 2022-08-18 DIAGNOSIS — H25.13: ICD-10-CM

## 2022-08-18 DIAGNOSIS — H43.393: ICD-10-CM

## 2022-08-18 PROCEDURE — 92004 COMPRE OPH EXAM NEW PT 1/>: CPT | Performed by: OPHTHALMOLOGY

## 2022-08-18 PROCEDURE — 92015 DETERMINE REFRACTIVE STATE: CPT | Performed by: OPHTHALMOLOGY

## 2022-08-18 PROCEDURE — 92250 FUNDUS PHOTOGRAPHY W/I&R: CPT | Performed by: OPHTHALMOLOGY

## 2022-08-18 ASSESSMENT — SPHEQUIV_DERIVED
OS_SPHEQUIV: 3.125
OD_SPHEQUIV: 2.375
OD_SPHEQUIV: 3
OS_SPHEQUIV: 3.125

## 2022-08-18 ASSESSMENT — REFRACTION_CURRENTRX
OS_AXIS: 104
OD_VPRISM_DIRECTION: PROGS
OS_ADD: +2.50
OD_CYLINDER: -1.25
OS_VPRISM_DIRECTION: PROGS
OD_AXIS: 038
OS_OVR_VA: 20/
OS_SPHERE: +3.75
OS_CYLINDER: -1.25
OD_SPHERE: +3.00
OD_OVR_VA: 20/
OD_ADD: +2.50

## 2022-08-18 ASSESSMENT — KERATOMETRY
OS_K2POWER_DIOPTERS: 43.50
OS_K1POWER_DIOPTERS: 42.50
OS_AXISANGLE_DEGREES: 017
OD_K1POWER_DIOPTERS: 43.00
OD_AXISANGLE_DEGREES: 169
OD_K2POWER_DIOPTERS: 44.00

## 2022-08-18 ASSESSMENT — AXIALLENGTH_DERIVED
OD_AL: 22.7015
OS_AL: 22.6014
OD_AL: 22.4783
OS_AL: 22.6014

## 2022-08-18 ASSESSMENT — REFRACTION_AUTOREFRACTION
OD_CYLINDER: -1.00
OD_AXIS: 067
OS_AXIS: 114
OD_SPHERE: +3.50
OS_SPHERE: +3.75
OS_CYLINDER: -1.25

## 2022-08-18 ASSESSMENT — REFRACTION_MANIFEST
OS_VA1: 20/30
OD_ADD: +2.75
OS_SPHERE: +3.75
OS_AXIS: 104
OD_AXIS: 038
OS_ADD: +2.75
OD_VA2: 20/20
OD_SPHERE: +3.00
OS_CYLINDER: -1.25
OD_VA1: 20/30
OS_VA2: 20/20
OD_CYLINDER: -1.25

## 2022-08-18 ASSESSMENT — CONFRONTATIONAL VISUAL FIELD TEST (CVF)
OS_FINDINGS: FULL
OD_FINDINGS: FULL

## 2022-08-18 ASSESSMENT — VISUAL ACUITY
OD_BCVA: 20/30-
OS_BCVA: 20/30

## 2022-08-18 ASSESSMENT — TONOMETRY
OD_IOP_MMHG: 15
OS_IOP_MMHG: 15

## 2024-07-10 ENCOUNTER — APPOINTMENT (OUTPATIENT)
Dept: SURGERY | Facility: CLINIC | Age: 69
End: 2024-07-10
Payer: COMMERCIAL

## 2024-07-10 VITALS
RESPIRATION RATE: 16 BRPM | HEART RATE: 72 BPM | WEIGHT: 244 LBS | OXYGEN SATURATION: 95 % | SYSTOLIC BLOOD PRESSURE: 122 MMHG | DIASTOLIC BLOOD PRESSURE: 78 MMHG | HEIGHT: 70 IN | TEMPERATURE: 98.7 F | BODY MASS INDEX: 34.93 KG/M2

## 2024-07-10 DIAGNOSIS — E66.9 OBESITY, UNSPECIFIED: ICD-10-CM

## 2024-07-10 DIAGNOSIS — Z87.448 PERSONAL HISTORY OF OTHER DISEASES OF URINARY SYSTEM: ICD-10-CM

## 2024-07-10 DIAGNOSIS — K42.9 UMBILICAL HERNIA W/OUT OBSTRUCTION OR GANGRENE: ICD-10-CM

## 2024-07-10 DIAGNOSIS — Z86.39 PERSONAL HISTORY OF OTHER ENDOCRINE, NUTRITIONAL AND METABOLIC DISEASE: ICD-10-CM

## 2024-07-10 PROCEDURE — 99203 OFFICE O/P NEW LOW 30 MIN: CPT

## 2024-07-10 RX ORDER — TAMSULOSIN HYDROCHLORIDE 0.4 MG/1
CAPSULE ORAL
Refills: 0 | Status: ACTIVE | COMMUNITY

## 2024-11-25 ENCOUNTER — APPOINTMENT (OUTPATIENT)
Dept: GASTROENTEROLOGY | Facility: CLINIC | Age: 69
End: 2024-11-25
Payer: COMMERCIAL

## 2024-11-25 VITALS
BODY MASS INDEX: 35.07 KG/M2 | OXYGEN SATURATION: 96 % | WEIGHT: 245 LBS | HEIGHT: 70 IN | DIASTOLIC BLOOD PRESSURE: 80 MMHG | SYSTOLIC BLOOD PRESSURE: 136 MMHG | RESPIRATION RATE: 14 BRPM | HEART RATE: 83 BPM

## 2024-11-25 DIAGNOSIS — Z12.11 ENCOUNTER FOR SCREENING FOR MALIGNANT NEOPLASM OF COLON: ICD-10-CM

## 2024-11-25 DIAGNOSIS — K42.9 UMBILICAL HERNIA W/OUT OBSTRUCTION OR GANGRENE: ICD-10-CM

## 2024-11-25 DIAGNOSIS — K21.00 GASTRO-ESOPHAGEAL REFLUX DISEASE WITH ESOPHAGITIS, WITHOUT BLEEDING: ICD-10-CM

## 2024-11-25 PROCEDURE — 99204 OFFICE O/P NEW MOD 45 MIN: CPT

## 2025-04-23 ENCOUNTER — EMERGENCY (EMERGENCY)
Facility: HOSPITAL | Age: 70
LOS: 1 days | End: 2025-04-23
Attending: STUDENT IN AN ORGANIZED HEALTH CARE EDUCATION/TRAINING PROGRAM
Payer: MEDICARE

## 2025-04-23 VITALS
SYSTOLIC BLOOD PRESSURE: 133 MMHG | RESPIRATION RATE: 18 BRPM | HEIGHT: 70 IN | DIASTOLIC BLOOD PRESSURE: 89 MMHG | OXYGEN SATURATION: 95 % | WEIGHT: 250 LBS | TEMPERATURE: 98 F | HEART RATE: 75 BPM

## 2025-04-23 DIAGNOSIS — R07.9 CHEST PAIN, UNSPECIFIED: ICD-10-CM

## 2025-04-23 DIAGNOSIS — Z98.89 OTHER SPECIFIED POSTPROCEDURAL STATES: Chronic | ICD-10-CM

## 2025-04-23 DIAGNOSIS — R55 SYNCOPE AND COLLAPSE: ICD-10-CM

## 2025-04-23 LAB
ALBUMIN SERPL ELPH-MCNC: 3.9 G/DL — SIGNIFICANT CHANGE UP (ref 3.3–5.2)
ALP SERPL-CCNC: 59 U/L — SIGNIFICANT CHANGE UP (ref 40–120)
ALT FLD-CCNC: 24 U/L — SIGNIFICANT CHANGE UP
ANION GAP SERPL CALC-SCNC: 14 MMOL/L — SIGNIFICANT CHANGE UP (ref 5–17)
APTT BLD: 28.1 SEC — SIGNIFICANT CHANGE UP (ref 26.1–36.8)
AST SERPL-CCNC: 23 U/L — SIGNIFICANT CHANGE UP
BASOPHILS # BLD AUTO: 0.08 K/UL — SIGNIFICANT CHANGE UP (ref 0–0.2)
BASOPHILS NFR BLD AUTO: 1.1 % — SIGNIFICANT CHANGE UP (ref 0–2)
BILIRUB SERPL-MCNC: 0.3 MG/DL — LOW (ref 0.4–2)
BUN SERPL-MCNC: 18.4 MG/DL — SIGNIFICANT CHANGE UP (ref 8–20)
CALCIUM SERPL-MCNC: 8.8 MG/DL — SIGNIFICANT CHANGE UP (ref 8.4–10.5)
CHLORIDE SERPL-SCNC: 101 MMOL/L — SIGNIFICANT CHANGE UP (ref 96–108)
CK SERPL-CCNC: 92 U/L — SIGNIFICANT CHANGE UP (ref 30–200)
CO2 SERPL-SCNC: 23 MMOL/L — SIGNIFICANT CHANGE UP (ref 22–29)
CREAT SERPL-MCNC: 0.88 MG/DL — SIGNIFICANT CHANGE UP (ref 0.5–1.3)
D DIMER BLD IA.RAPID-MCNC: <150 NG/ML DDU — SIGNIFICANT CHANGE UP
EGFR: 94 ML/MIN/1.73M2 — SIGNIFICANT CHANGE UP
EGFR: 94 ML/MIN/1.73M2 — SIGNIFICANT CHANGE UP
EOSINOPHIL # BLD AUTO: 0.27 K/UL — SIGNIFICANT CHANGE UP (ref 0–0.5)
EOSINOPHIL NFR BLD AUTO: 3.7 % — SIGNIFICANT CHANGE UP (ref 0–6)
GLUCOSE SERPL-MCNC: 109 MG/DL — HIGH (ref 70–99)
HCT VFR BLD CALC: 46.3 % — SIGNIFICANT CHANGE UP (ref 39–50)
HGB BLD-MCNC: 15.3 G/DL — SIGNIFICANT CHANGE UP (ref 13–17)
IMM GRANULOCYTES # BLD AUTO: 0.03 K/UL — SIGNIFICANT CHANGE UP (ref 0–0.07)
IMM GRANULOCYTES NFR BLD AUTO: 0.4 % — SIGNIFICANT CHANGE UP (ref 0–0.9)
INR BLD: 0.96 RATIO — SIGNIFICANT CHANGE UP (ref 0.85–1.16)
LYMPHOCYTES # BLD AUTO: 2.88 K/UL — SIGNIFICANT CHANGE UP (ref 1–3.3)
LYMPHOCYTES NFR BLD AUTO: 39.9 % — SIGNIFICANT CHANGE UP (ref 13–44)
MAGNESIUM SERPL-MCNC: 2 MG/DL — SIGNIFICANT CHANGE UP (ref 1.6–2.6)
MCHC RBC-ENTMCNC: 27.8 PG — SIGNIFICANT CHANGE UP (ref 27–34)
MCHC RBC-ENTMCNC: 33 G/DL — SIGNIFICANT CHANGE UP (ref 32–36)
MCV RBC AUTO: 84.2 FL — SIGNIFICANT CHANGE UP (ref 80–100)
MONOCYTES # BLD AUTO: 0.68 K/UL — SIGNIFICANT CHANGE UP (ref 0–0.9)
MONOCYTES NFR BLD AUTO: 9.4 % — SIGNIFICANT CHANGE UP (ref 2–14)
NEUTROPHILS # BLD AUTO: 3.28 K/UL — SIGNIFICANT CHANGE UP (ref 1.8–7.4)
NEUTROPHILS NFR BLD AUTO: 45.5 % — SIGNIFICANT CHANGE UP (ref 43–77)
NRBC # BLD AUTO: 0 K/UL — SIGNIFICANT CHANGE UP (ref 0–0)
NRBC # FLD: 0 K/UL — SIGNIFICANT CHANGE UP (ref 0–0)
NRBC BLD AUTO-RTO: 0 /100 WBCS — SIGNIFICANT CHANGE UP (ref 0–0)
NT-PROBNP SERPL-SCNC: <36 PG/ML — SIGNIFICANT CHANGE UP (ref 0–300)
PLATELET # BLD AUTO: 264 K/UL — SIGNIFICANT CHANGE UP (ref 150–400)
PMV BLD: 10.1 FL — SIGNIFICANT CHANGE UP (ref 7–13)
POTASSIUM SERPL-MCNC: 4.3 MMOL/L — SIGNIFICANT CHANGE UP (ref 3.5–5.3)
POTASSIUM SERPL-SCNC: 4.3 MMOL/L — SIGNIFICANT CHANGE UP (ref 3.5–5.3)
PROT SERPL-MCNC: 7.3 G/DL — SIGNIFICANT CHANGE UP (ref 6.6–8.7)
PROTHROM AB SERPL-ACNC: 11.1 SEC — SIGNIFICANT CHANGE UP (ref 9.9–13.4)
RBC # BLD: 5.5 M/UL — SIGNIFICANT CHANGE UP (ref 4.2–5.8)
RBC # FLD: 15.4 % — HIGH (ref 10.3–14.5)
SODIUM SERPL-SCNC: 138 MMOL/L — SIGNIFICANT CHANGE UP (ref 135–145)
TROPONIN T, HIGH SENSITIVITY RESULT: 11 NG/L — SIGNIFICANT CHANGE UP (ref 0–51)
TROPONIN T, HIGH SENSITIVITY RESULT: 14 NG/L — SIGNIFICANT CHANGE UP (ref 0–51)
WBC # BLD: 7.22 K/UL — SIGNIFICANT CHANGE UP (ref 3.8–10.5)
WBC # FLD AUTO: 7.22 K/UL — SIGNIFICANT CHANGE UP (ref 3.8–10.5)

## 2025-04-23 PROCEDURE — 99285 EMERGENCY DEPT VISIT HI MDM: CPT

## 2025-04-23 PROCEDURE — 71045 X-RAY EXAM CHEST 1 VIEW: CPT | Mod: 26

## 2025-04-23 PROCEDURE — 70450 CT HEAD/BRAIN W/O DYE: CPT | Mod: 26

## 2025-04-23 PROCEDURE — 93010 ELECTROCARDIOGRAM REPORT: CPT

## 2025-04-23 RX ORDER — ACETAMINOPHEN 500 MG/5ML
1000 LIQUID (ML) ORAL ONCE
Refills: 0 | Status: COMPLETED | OUTPATIENT
Start: 2025-04-23 | End: 2025-04-23

## 2025-04-23 RX ADMIN — Medication 3 MILLILITER(S): at 20:25

## 2025-04-23 RX ADMIN — Medication 400 MILLIGRAM(S): at 23:06

## 2025-04-23 NOTE — ED ADULT TRIAGE NOTE - CHIEF COMPLAINT QUOTE
stretcher
pt c/o syncope followed by chest pain today at home while walking. pt stated that he passed out for a minute or so

## 2025-04-23 NOTE — ED ADULT NURSE NOTE - OBJECTIVE STATEMENT
Pt presents to the ED c/o chest pain after a syncopal episode at home. Pt is AOx4, breathing is even and unlabored bilaterally. pt denies SOB, N/V/D, blurred vision, HA, abd pain, urinary symptoms, or any other complaints. NSR on CM. Pt pending CT scan.

## 2025-04-23 NOTE — CONSULT NOTE ADULT - PROBLEM SELECTOR RECOMMENDATION 2
Plan as above  - Check orthostatic vital signs   - Pending TTE for evaluation of cardiac function, WMA, and any valvular abnormalities   - May consider eventual ischemic evaluation pending echo results   - CT Head to r/o acute neuro pathology    - Consider IVF for gentle hydration   - SR/SB on tele; may need Holter vs MCOT for further evaluation of arrhythmias     Assessment and recommendations are final when note is signed by the attending.

## 2025-04-23 NOTE — CONSULT NOTE ADULT - NS ATTEND AMEND GEN_ALL_CORE FT
-    69m HX preDM, GERD, hypothyroidism presents s/p near syncopal episode. Per ED note, pt reported chest pain but now denying any chest pain.     Near syncope  - admits CP  - Troponin 14  - serial CE, ekg, Echo, Telemetry  - EKG SR @ 75bpm, poss septal infarct age undetermined  - Check orthostatic vital signs   - per ED documentation pt with intermittent left-sided chest pain therefore will obtain CCTA   - CT Head to r/o acute neuro pathology, consider neuro consult  - Consider IVF for gentle hydration   - SR/SB on tele; may need Holter vs MCOT for further evaluation of arrhythmias

## 2025-04-23 NOTE — ED PROVIDER NOTE - CLINICAL SUMMARY MEDICAL DECISION MAKING FREE TEXT BOX
69-year-old male with history of hypothyroidism on Synthroid presents to the ED complaining of  becoming dizzy with near syncopal episode after walking 10 laps at [employer] earlier today.  Patient states he felt lightheaded like he might pass out was able to go back to his car and drink water and felt better.  Patient drove himself home and then developed  intermittent left-sided chest pain and pain in his right upper thigh area  which lasted approximately 10 minutes.   patient describes as cramping in nature and subsequently came to the ED for evaluation.  Patient denies any associated  shortness of breath, nausea, vomiting, diaphoresis or syncope.  Patient denies ever being seen by evaluated by cardiology in the past.   EKG sinus with no acute changes.  Based on history we will check labs cardiac enzymes CT head chest x-ray and consider placing on knobs for TTT and possible cardiology eval in the morning

## 2025-04-23 NOTE — ED ADULT NURSE NOTE - NSFALLUNIVINTERV_ED_ALL_ED
Bed/Stretcher in lowest position, wheels locked, appropriate side rails in place/Call bell, personal items and telephone in reach/Instruct patient to call for assistance before getting out of bed/chair/stretcher/Non-slip footwear applied when patient is off stretcher/[place] to call system/Physically safe environment - no spills, clutter or unnecessary equipment/Purposeful proactive rounding/Room/bathroom lighting operational, light cord in reach

## 2025-04-23 NOTE — ED PROVIDER NOTE - OBJECTIVE STATEMENT
69-year-old male with history of hypothyroidism on Synthroid presents to the ED complaining of  becoming dizzy with near syncopal episode after walking 10 laps at [employer] earlier today.  Patient states he felt lightheaded like he might pass out was able to go back to his car and drink water and felt better.  Patient drove himself home and then developed  intermittent left-sided chest pain and pain in his right upper thigh area  which lasted approximately 10 minutes.   patient describes as cramping in nature and subsequently came to the ED for evaluation.  Patient denies any associated  shortness of breath, nausea, vomiting, diaphoresis or syncope.  Patient denies ever being seen by evaluated by cardiology in the past

## 2025-04-23 NOTE — CONSULT NOTE ADULT - PROBLEM SELECTOR RECOMMENDATION 9
Monitor on telemetry for acute arrhythmias or recurrent angina symptoms   - Troponin 14--> ; pBNP ; Ddimer    - EKG SR @ 75bpm   - Pending TTE for evaluation of cardiac function, WMA, and any valvular abnormalities   - Maintain NPO for possible further inpatient ischemic workup, consider CCTA    - Replete electrolytes as needed; Maintain K > 4 and Mg > 2  - Repeat ECG if any clinical indication or change on tele   - Lipid Panel and A1C in AM Continue to monitor on telemetry   - Troponin 14--> ; pBNP ; Ddimer    - EKG SR @ 75bpm, poss septal infarct age undetermined  - Check orthostatic vital signs   - Pending TTE for evaluation of cardiac function, WMA, and any valvular abnormalities   - May consider eventual ischemic evaluation pending echo results   - CT Head to r/o acute neuro pathology    - Consider IVF for gentle hydration   - SR/SB on tele; may need Holter vs MCOT for further evaluation of arrhythmias   - Replete electrolytes as needed; Maintain K > 4 and Mg > 2  - Repeat ECG if any clinical indication or change on tele   - Lipid Panel and A1C in AM    Assessment and recommendations are final when note is signed by the attending. Continue to monitor on telemetry   - Troponin 14--> ; pBNP ; Ddimer    - EKG SR @ 75bpm, poss septal infarct age undetermined  - Check orthostatic vital signs   - Pending TTE for evaluation of cardiac function, WMA, and any valvular abnormalities   - Pt now denying any chest pain, however, per ED documentation pt with intermittent left-sided chest pain therefore will obtain CCTA   - CT Head to r/o acute neuro pathology, consider neuro consult  - Consider IVF for gentle hydration   - SR/SB on tele; may need Holter vs MCOT for further evaluation of arrhythmias   - Replete electrolytes as needed; Maintain K > 4 and Mg > 2  - Repeat ECG if any clinical indication or change on tele   - Lipid Panel and A1C in AM    Assessment and recommendations are final when note is signed by the attending.

## 2025-04-23 NOTE — ED PROVIDER NOTE - PRINCIPAL DIAGNOSIS
Hand itching/tingling:    There is currently no sign of infection or inflammation    Return if:  Fever, swelling, other concerns   Near syncope

## 2025-04-23 NOTE — ED ADULT NURSE NOTE - CHIEF COMPLAINT QUOTE
pt c/o syncope followed by chest pain today at home while walking. pt stated that he passed out for a minute or so

## 2025-04-23 NOTE — CONSULT NOTE ADULT - ASSESSMENT
69 y.o. male with PMHx of preDM, GERD, hypothyroidism presents s/p near syncopal episode. Pt states he normally walks for exercise and was walking at the park today. Pt states he was planning on walking 10 laps but after about 8 laps, pt became dizzy/weak and nearly syncopized. Pt sat down on the floor for less than a minute with some improvement. Pt was then able to ambulate to car and drink water with some improvement in sxs. Pt also reports orthopnea and right thigh pain. Pt states initially perceived as right knee pain but now states pain is located in the back of the thigh. Per ED note, pt reported chest pain but now denying any chest pain. Pt does not have OP cardiologist and denies any known cardiac history. Denies chest pain, back pain, headache, SOB, LE, diaphoresis, or N/V.

## 2025-04-24 ENCOUNTER — RESULT REVIEW (OUTPATIENT)
Age: 70
End: 2025-04-24

## 2025-04-24 VITALS
RESPIRATION RATE: 18 BRPM | SYSTOLIC BLOOD PRESSURE: 116 MMHG | HEART RATE: 60 BPM | TEMPERATURE: 98 F | OXYGEN SATURATION: 95 % | DIASTOLIC BLOOD PRESSURE: 78 MMHG

## 2025-04-24 LAB
A1C WITH ESTIMATED AVERAGE GLUCOSE RESULT: 6.7 % — HIGH (ref 4–5.6)
CHOLEST SERPL-MCNC: 134 MG/DL — SIGNIFICANT CHANGE UP
ESTIMATED AVERAGE GLUCOSE: 146 MG/DL — HIGH (ref 68–114)
HDLC SERPL-MCNC: 46 MG/DL — SIGNIFICANT CHANGE UP
LDLC SERPL-MCNC: 67 MG/DL — SIGNIFICANT CHANGE UP
LIPID PNL WITH DIRECT LDL SERPL: 67 MG/DL — SIGNIFICANT CHANGE UP
NONHDLC SERPL-MCNC: 88 MG/DL — SIGNIFICANT CHANGE UP
TRIGL SERPL-MCNC: 117 MG/DL — SIGNIFICANT CHANGE UP

## 2025-04-24 PROCEDURE — 96374 THER/PROPH/DIAG INJ IV PUSH: CPT

## 2025-04-24 PROCEDURE — 83036 HEMOGLOBIN GLYCOSYLATED A1C: CPT

## 2025-04-24 PROCEDURE — 99285 EMERGENCY DEPT VISIT HI MDM: CPT | Mod: 25

## 2025-04-24 PROCEDURE — 83735 ASSAY OF MAGNESIUM: CPT

## 2025-04-24 PROCEDURE — 70450 CT HEAD/BRAIN W/O DYE: CPT | Mod: MC

## 2025-04-24 PROCEDURE — 93306 TTE W/DOPPLER COMPLETE: CPT

## 2025-04-24 PROCEDURE — 80053 COMPREHEN METABOLIC PANEL: CPT

## 2025-04-24 PROCEDURE — 75574 CT ANGIO HRT W/3D IMAGE: CPT | Mod: 26

## 2025-04-24 PROCEDURE — 82550 ASSAY OF CK (CPK): CPT

## 2025-04-24 PROCEDURE — G0378: CPT

## 2025-04-24 PROCEDURE — 83880 ASSAY OF NATRIURETIC PEPTIDE: CPT

## 2025-04-24 PROCEDURE — 85379 FIBRIN DEGRADATION QUANT: CPT

## 2025-04-24 PROCEDURE — 93306 TTE W/DOPPLER COMPLETE: CPT | Mod: 26

## 2025-04-24 PROCEDURE — 71045 X-RAY EXAM CHEST 1 VIEW: CPT

## 2025-04-24 PROCEDURE — 85730 THROMBOPLASTIN TIME PARTIAL: CPT

## 2025-04-24 PROCEDURE — 85610 PROTHROMBIN TIME: CPT

## 2025-04-24 PROCEDURE — 36415 COLL VENOUS BLD VENIPUNCTURE: CPT

## 2025-04-24 PROCEDURE — 75574 CT ANGIO HRT W/3D IMAGE: CPT | Mod: MC

## 2025-04-24 PROCEDURE — 85025 COMPLETE CBC W/AUTO DIFF WBC: CPT

## 2025-04-24 PROCEDURE — 93005 ELECTROCARDIOGRAM TRACING: CPT

## 2025-04-24 PROCEDURE — 84484 ASSAY OF TROPONIN QUANT: CPT

## 2025-04-24 PROCEDURE — 99236 HOSP IP/OBS SAME DATE HI 85: CPT

## 2025-04-24 PROCEDURE — 80061 LIPID PANEL: CPT

## 2025-04-24 RX ORDER — LEVOTHYROXINE SODIUM 300 MCG
112 TABLET ORAL DAILY
Refills: 0 | Status: ACTIVE | OUTPATIENT
Start: 2025-04-24 | End: 2026-03-23

## 2025-04-24 RX ORDER — TAMSULOSIN HYDROCHLORIDE 0.4 MG/1
0.4 CAPSULE ORAL AT BEDTIME
Refills: 0 | Status: ACTIVE | OUTPATIENT
Start: 2025-04-24 | End: 2026-03-23

## 2025-04-24 RX ORDER — METOPROLOL SUCCINATE 50 MG/1
25 TABLET, EXTENDED RELEASE ORAL ONCE
Refills: 0 | Status: COMPLETED | OUTPATIENT
Start: 2025-04-24 | End: 2025-04-24

## 2025-04-24 RX ADMIN — METOPROLOL SUCCINATE 25 MILLIGRAM(S): 50 TABLET, EXTENDED RELEASE ORAL at 08:13

## 2025-04-24 RX ADMIN — Medication 112 MICROGRAM(S): at 05:39

## 2025-04-24 RX ADMIN — Medication 100 MILLILITER(S): at 02:45

## 2025-04-24 RX ADMIN — Medication 40 MILLIGRAM(S): at 05:39

## 2025-04-24 NOTE — ED CDU PROVIDER INITIAL DAY NOTE - OBJECTIVE STATEMENT
69 year old male PMhx hypothyroid, GERD. BPH presents to ED for near syncope. Pt reports he usually does 6-7 laps at the park, today he attempted to do 10, at lap 8 felt lighted headed and needed to sit down. Pt reports symptoms lasted about a minutes. pt then went to car and drank water. Pt denies chest pain, fever, chills, cough, congestion, SOB, LE, palpitations, dizziness, abd pain, nausea, vomiting, diarrhea.

## 2025-04-24 NOTE — ED ADULT NURSE REASSESSMENT NOTE - NS ED NURSE REASSESS COMMENT FT1
Patient resting comfortably on the stretcher. Meds given as ordered.AXOX4, pending TTE CTA.  CM NSR. NAD noted.

## 2025-04-24 NOTE — ED CDU PROVIDER DISPOSITION NOTE - ADDITIONAL NOTES AND INSTRUCTIONS:
PT was evaluated At [facility] ED and was found to have a condition that warranted time of to rest and heal from WORK/SCHOOL.   Mani Wade PA-C

## 2025-04-24 NOTE — ED CDU PROVIDER INITIAL DAY NOTE - PHYSICAL EXAMINATION
General: Awake, alert, lying in bed in NAD  HEENT: Normocephalic, atraumatic. No scleral icterus or conjunctival injection. EOMI. Moist mucous membranes. Oropharynx clear.   Neck:. Soft and supple.  Cardiac: RRR, Peripheral pulses 2+ and symmetric. No LE edema.  Resp: Lungs CTAB. No accessory muscle use  Abd: Soft, non-tender, non-distended. No guarding, rebound, or rigidity.  Ext: Able to move all 4 extremities. No obvious deformities. neurovascularly intact.   Back: Spine midline and non-tender.   Skin: No rashes, abrasions, or lacerations.  Neuro: AO x 4. Moves all extremities symmetrically. Motor strength and sensation grossly intact.  Psych: Appropriate mood and affect

## 2025-04-24 NOTE — ED CDU PROVIDER INITIAL DAY NOTE - CLINICAL SUMMARY MEDICAL DECISION MAKING FREE TEXT BOX
69 year old male PMhx hypothyroid, GERD. BPH presents to ED for near syncope. Pt reports he usually does 6-7 laps at the park, today he attempted to do 10, at lap 8 felt lighted headed and needed to sit down. Pt reports symptoms lasted about a minutes. pt then went to car and drank water. Pt denies chest pain, fever, chills, cough, congestion, SOB, LE, palpitations, dizziness, abd pain, nausea, vomiting, diarrhea.   labs unremarkable, cards consulted.   pt placed in observation for TTE

## 2025-04-24 NOTE — ED CDU PROVIDER DISPOSITION NOTE - PATIENT PORTAL LINK FT
You can access the FollowMyHealth Patient Portal offered by [facility] by registering at the following website: [email or web address]. By joining [facility]’s FollowMyHealth portal, you will also be able to view your health information using other applications (apps) compatible with our system.

## 2025-04-24 NOTE — ED CDU PROVIDER INITIAL DAY NOTE - PROGRESS NOTE DETAILS
69-year-old male placed in [place] for near syncope.  Patient was running, states he normally runs 6 laps went to run tendon around lab 8 started to feel lightheaded.  Seen by cardiology recommending CCTA as well as TTE. orthostatics negative. Piedad Duncan MD Attending Physician-

## 2025-04-24 NOTE — ED CDU PROVIDER DISPOSITION NOTE - CARE PROVIDER_API CALL
Jennifer Barrios  Gastroenterology  [address], [address]  [place] [address]  Phone: [phone]  Fax: [phone]  Follow Up Time:     Vinnie Prasad  Thoracic and Cardiac Surgery  [address] [place] [address]  Phone: [phone]  Fax: [phone]  Follow Up Time:

## 2025-04-24 NOTE — ED CDU PROVIDER DISPOSITION NOTE - CLINICAL COURSE
69-year-old male with history of hypothyroidism on Synthroid presents to the ED complaining of  becoming dizzy with near syncopal episode after walking 10 laps at [email or web address] earlier today.  Patient states he felt lightheaded like he might pass out was able to go back to his car and drink water and felt better. Pt placed in obs for evaluation in incident to determine if possible cardiogenic cause. Pt seen by cards recs appreciated, no acute finding son CTCA, ECHO and had resolution of symptoms. Pt cleared for dc home with supportive care, no change in medications, educated about slow progression of exercise. Pt educated about when to return to the ED if needed. PT verbalizes that he understands all instructions and results. Pt informed that ED is open and available 24/7 365 days a yr, encouraged to return to the ED if they have any change in condition, or feel the need for revaluation.

## 2025-04-24 NOTE — ED CDU PROVIDER DISPOSITION NOTE - CARE PROVIDERS DIRECT ADDRESSES
[email or web address]

## 2025-04-24 NOTE — ED CDU PROVIDER DISPOSITION NOTE - ATTENDING CONTRIBUTION TO CARE
69-year-old male placed in observation for near syncope.  Patient was running, states he normally runs 6 laps went to run tendon around lab 8 started to feel lightheaded.  Seen by cardiology recommending CCTA as well as TTE. orthostatics negative.CCTA and TTE without acute pathology/without actionable emergent findings.  Feels improved.  Stable for discharge with outpatient follow-up.  Patient provided follow-up with GI as well as CT surgery for incidental findings.

## 2025-04-24 NOTE — ED CDU PROVIDER DISPOSITION NOTE - NSFOLLOWUPINSTRUCTIONS_ED_ALL_ED_FT
69-year-old male with history of hypothyroidism on Synthroid presents to the ED complaining of  becoming dizzy with near syncopal episode after walking 10 laps at [employer] earlier today.  Patient states he felt lightheaded like he might pass out was able to go back to his car and drink water and felt better. Pt placed in obs for evaluation in incident to determine if possible cardiogenic cause. Pt seen by cards recs appreciated, no acute finding son CTCA, ECHO and had resolution of symptoms. Pt cleared for dc home with supportive care, no change in medications, educated about slow progression of exercise.  Pt made aware of incidental findings and recs to follow up to GI RPN and CT Sx in the next yr. Pt educated about when to return to the ED if needed. PT verbalizes that he understands all instructions and results. Pt informed that ED is open and available 24/7 365 days a yr, encouraged to return to the ED if they have any change in condition, or feel the need for revaluation.

## 2025-06-27 NOTE — ED ADULT NURSE NOTE - PRO INTERPRETER NEED 2
Physical therapy:    Chart reviewed and spoke with RN. Evaluation attempted. Pt remains on BiPAP and not appropriate for skilled intervention.    Chyna Allison, PT, DPT     English